# Patient Record
Sex: MALE | Race: WHITE | NOT HISPANIC OR LATINO | URBAN - METROPOLITAN AREA
[De-identification: names, ages, dates, MRNs, and addresses within clinical notes are randomized per-mention and may not be internally consistent; named-entity substitution may affect disease eponyms.]

---

## 2017-01-31 PROBLEM — S62.630A CLOSED DISPLACED FRACTURE OF DISTAL PHALANX OF RIGHT INDEX FINGER, INITIAL ENCOUNTER: Status: ACTIVE | Noted: 2017-01-31

## 2021-03-05 PROCEDURE — 88305 TISSUE EXAM BY PATHOLOGIST: CPT | Performed by: INTERNAL MEDICINE

## 2024-10-03 ENCOUNTER — TELEPHONE (OUTPATIENT)
Dept: FAMILY MEDICINE | Age: 59
End: 2024-10-03

## 2024-10-04 ENCOUNTER — OFFICE VISIT (OUTPATIENT)
Dept: FAMILY MEDICINE | Age: 59
End: 2024-10-04

## 2024-10-04 VITALS
BODY MASS INDEX: 26.92 KG/M2 | DIASTOLIC BLOOD PRESSURE: 72 MMHG | RESPIRATION RATE: 16 BRPM | SYSTOLIC BLOOD PRESSURE: 114 MMHG | OXYGEN SATURATION: 95 % | HEART RATE: 96 BPM | WEIGHT: 161.6 LBS | HEIGHT: 65 IN | TEMPERATURE: 98.1 F

## 2024-10-04 DIAGNOSIS — H60.543 ECZEMA OF BOTH EXTERNAL EARS: Primary | ICD-10-CM

## 2024-10-04 RX ORDER — TRIAMCINOLONE ACETONIDE 1 MG/G
1 OINTMENT TOPICAL 2 TIMES DAILY
Qty: 60 G | Refills: 0 | Status: SHIPPED | OUTPATIENT
Start: 2024-10-04

## 2024-10-04 ASSESSMENT — PATIENT HEALTH QUESTIONNAIRE - PHQ9
SUM OF ALL RESPONSES TO PHQ9 QUESTIONS 1 AND 2: 0
1. LITTLE INTEREST OR PLEASURE IN DOING THINGS: NOT AT ALL
CLINICAL INTERPRETATION OF PHQ2 SCORE: NO FURTHER SCREENING NEEDED
SUM OF ALL RESPONSES TO PHQ9 QUESTIONS 1 AND 2: 0
2. FEELING DOWN, DEPRESSED OR HOPELESS: NOT AT ALL

## 2024-10-04 ASSESSMENT — PAIN SCALES - GENERAL: PAINLEVEL: 0

## 2024-10-04 ASSESSMENT — ENCOUNTER SYMPTOMS: ROS SKIN COMMENTS: SEE HPI

## 2024-11-19 ENCOUNTER — APPOINTMENT (OUTPATIENT)
Dept: FAMILY MEDICINE | Age: 59
End: 2024-11-19

## 2025-01-02 ENCOUNTER — E-ADVICE (OUTPATIENT)
Dept: FAMILY MEDICINE | Age: 60
End: 2025-01-02

## 2025-01-02 ENCOUNTER — TELEPHONE (OUTPATIENT)
Dept: FAMILY MEDICINE | Age: 60
End: 2025-01-02

## 2025-02-05 ENCOUNTER — OFFICE VISIT (OUTPATIENT)
Dept: FAMILY MEDICINE CLINIC | Facility: CLINIC | Age: 60
End: 2025-02-05
Payer: COMMERCIAL

## 2025-02-05 VITALS
WEIGHT: 169 LBS | HEIGHT: 65 IN | RESPIRATION RATE: 18 BRPM | BODY MASS INDEX: 28.16 KG/M2 | DIASTOLIC BLOOD PRESSURE: 78 MMHG | HEART RATE: 89 BPM | SYSTOLIC BLOOD PRESSURE: 118 MMHG | OXYGEN SATURATION: 98 %

## 2025-02-05 DIAGNOSIS — L30.9 ECZEMA, UNSPECIFIED TYPE: Primary | ICD-10-CM

## 2025-02-05 PROCEDURE — 3074F SYST BP LT 130 MM HG: CPT | Performed by: NURSE PRACTITIONER

## 2025-02-05 PROCEDURE — 3008F BODY MASS INDEX DOCD: CPT | Performed by: NURSE PRACTITIONER

## 2025-02-05 PROCEDURE — 3078F DIAST BP <80 MM HG: CPT | Performed by: NURSE PRACTITIONER

## 2025-02-05 PROCEDURE — 99203 OFFICE O/P NEW LOW 30 MIN: CPT | Performed by: NURSE PRACTITIONER

## 2025-02-05 RX ORDER — TRIAMCINOLONE ACETONIDE 1 MG/G
1 OINTMENT TOPICAL 2 TIMES DAILY
COMMUNITY
Start: 2024-10-04

## 2025-02-05 RX ORDER — HYDROCORTISONE 25 MG/G
1 CREAM TOPICAL 2 TIMES DAILY
Qty: 28 G | Refills: 0 | Status: SHIPPED | OUTPATIENT
Start: 2025-02-05

## 2025-02-05 NOTE — PATIENT INSTRUCTIONS
Apply hydrocortisone to ears twice daily. About 15-20 minutes after applying hydrocortisone, apply Aquaphor.       May also apply Curel Itch Defense lotion through the day as needed.

## 2025-02-05 NOTE — PROGRESS NOTES
Sawyer Walsh is a 59 year old male who presents as a new patient to establish.     HPI:   Pt complains of several month history of red, itchy and irritated ears. Reports ears get flaky skin as well. Denies skin cracks open, has bleeding or drainage. Denies ear swell. Did Attempted to have e-visit for this with [facility] on 1/2/25, available notes reviewed by me.  Appears no prescriptions were provided at that time, no completed notes seen. Was also seen for this at [facility] 10/4/2024 by Indu Staton PA-C- notes reviewed by me. Managed with triamcinolone 0.1% in the past with some relief but reports now triamcinolone seems to cause lumpy feeling to ears. Has also used Aquaphor which does not seem to help either.     Denies other significant medical history.     Denies surgical history.     Owns small investment firm. Is  with 2 grown children. Does not watch diet. Does not exercise routinely. Reports is non-smoker. Drinks 0 alcoholic drinks per week.     Colon cancer screening: UTD, next due in March 2028    Wt Readings from Last 6 Encounters:   02/05/25 169 lb (76.7 kg)   02/17/21 166 lb (75.3 kg)   11/13/20 168 lb (76.2 kg)   01/31/17 160 lb (72.6 kg)   01/16/17 164 lb (74.4 kg)   11/05/15 164 lb (74.4 kg)       Cholesterol, Total (mg/dL)   Date Value   07/19/2013 227 (H)     Cholesterol (mg/dL)   Date Value   11/13/2020 167.00     HDL Cholesterol (mg/dL)   Date Value   07/19/2013 52     Direct HDL (mg/dL)   Date Value   11/13/2020 54.6     LDL Cholesterol Calc (mg/dL)   Date Value   07/19/2013 159 (H)     Calculated LDL (mg/dL)   Date Value   11/13/2020 103     Triglycerides (mg/dL)   Date Value   07/19/2013 80     AST (SGOT) (IU/L)   Date Value   07/19/2013 20     AST (U/L)   Date Value   11/13/2020 24     ALT (SGPT) (IU/L)   Date Value   07/19/2013 19     ALT (U/L)   Date Value   11/13/2020 19      Current Outpatient Medications   Medication Sig Dispense Refill     triamcinolone 0.1 % External Ointment Apply 1 Application topically 2 (two) times daily.      hydrocortisone 2.5 % External Cream Apply 1 Application topically 2 (two) times daily. 28 g 0      Past Medical History:    History of colon polyps    TA, HP    Kidney stone      Past Surgical History:   Procedure Laterality Date    Colonoscopy  2021    3 small polyps removed, scattered diverticulosis, small internal hemorrhoids.      Colonoscopy  2021    TA, HP polyps; repeat 7 years      Family History   Problem Relation Age of Onset    Diabetes Neg     Hypertension Neg     Lipids Neg     Heart Disorder Neg     Stroke Neg     Cancer Neg       Social History     Socioeconomic History    Marital status:    Tobacco Use    Smoking status: Former     Current packs/day: 0.00     Average packs/day: 0.5 packs/day for 20.0 years (10.0 ttl pk-yrs)     Types: Cigarettes     Start date: 1999     Quit date: 2019     Years since quittin.2    Smokeless tobacco: Never   Vaping Use    Vaping status: Never Used   Substance and Sexual Activity    Alcohol use: Not Currently     Comment: rare    Drug use: No   Other Topics Concern    Caffeine Concern No    Exercise No    Seat Belt No    Special Diet No    Stress Concern No    Weight Concern No      Social History: as above     Health Maintenance  Immunizations: Recommend flu vaccine for upcoming flu season.      Immunization History   Administered Date(s) Administered    Covid-19 Vaccine Pfizer 30 mcg/0.3 ml 2021, 2021, 2021    TDAP 2017   Pended Date(s) Pended    FLU VAC QIV SPLIT 3 YRS AND OLDER (46662) 2020   Deferred Date(s) Deferred    Influenza Vaccine Refused 2020     Dental Visits:  Regularly    REVIEW OF SYSTEMS:   GENERAL: feels well otherwise  EYES: Denies blurred/double vision or other vision changes, denies discharge from eyes  HENT: denies nasal congestion, sinus pain/pressure or ear discomfort  LUNGS: denies  shortness of breath with exertion  CARDIOVASCULAR: denies chest pain on exertion, palpitations  GI: denies abdominal pain,denies heartburn  NEURO: denies headaches, dizziness, lightheadedness    EXAM:   /78   Pulse 89   Resp 18   Ht 5' 5\" (1.651 m)   Wt 169 lb (76.7 kg)   SpO2 98%   BMI 28.12 kg/m²   Body mass index is 28.12 kg/m².   GENERAL: well developed, well nourished,in no apparent distress  SKIN: Bilateral external ears and ear canals erythematous with some flaking dry skin w/o edema, warmth to touch, induration, cracking/splitting, drainage. Posterior scalp with 4-5mm elevated, mildly erythematous, elevated lesion with rough texture with flaking skin, no TTP, induration, fluctuance or drainage.   HEENT: atraumatic and normocephalic, Bilateral TM clear.  EOMI  NECK: supple, w/o adenopathy. No JVD/bruits  LUNGS: clear to auscultation bilaterally. Effort non-labored.  CARDIO: RRR without murmur.   EXTREMITIES: no edema, clubbing or cyanosis. ROM to extremities WNL.  NEURO: Oriented times three, motor/sensory function intact. Follows commands appropriately.    ASSESSMENT AND PLAN:     Encounter Diagnosis   Name Primary?    Eczema, unspecified type- change to hydrocortisone 2.5% cream with Aquaphor after. May use OTC Curel Itch defense PRN throughout day in addition. Referred to Dr. Jorge for further eval and treat. Verbalized understanding of instructions and agreeable to this plan of care.    Yes       No orders of the defined types were placed in this encounter.      Meds & Refills for this Visit:  Requested Prescriptions     Signed Prescriptions Disp Refills    hydrocortisone 2.5 % External Cream 28 g 0     Sig: Apply 1 Application topically 2 (two) times daily.       Imaging & Consults:  DERM - INTERNAL    No follow-ups on file.  Patient Instructions                 Apply hydrocortisone to ears twice daily. About 15-20 minutes after applying hydrocortisone, apply Aquaphor.       May also apply  Curel Itch Defense lotion through the day as needed.